# Patient Record
Sex: FEMALE | Race: BLACK OR AFRICAN AMERICAN | NOT HISPANIC OR LATINO | Employment: OTHER | URBAN - METROPOLITAN AREA
[De-identification: names, ages, dates, MRNs, and addresses within clinical notes are randomized per-mention and may not be internally consistent; named-entity substitution may affect disease eponyms.]

---

## 2017-06-16 ENCOUNTER — OFFICE VISIT (OUTPATIENT)
Dept: PODIATRY | Facility: CLINIC | Age: 77
End: 2017-06-16
Payer: MEDICARE

## 2017-06-16 VITALS — HEIGHT: 66 IN | BODY MASS INDEX: 32.62 KG/M2 | WEIGHT: 203 LBS

## 2017-06-16 DIAGNOSIS — M21.40 PES PLANUS, UNSPECIFIED LATERALITY: ICD-10-CM

## 2017-06-16 DIAGNOSIS — M25.571 ACUTE RIGHT ANKLE PAIN: ICD-10-CM

## 2017-06-16 DIAGNOSIS — M79.671 FOOT PAIN, RIGHT: ICD-10-CM

## 2017-06-16 DIAGNOSIS — M76.829 PTTD (POSTERIOR TIBIAL TENDON DYSFUNCTION): Primary | ICD-10-CM

## 2017-06-16 PROCEDURE — 1159F MED LIST DOCD IN RCRD: CPT | Mod: S$GLB,,, | Performed by: PODIATRIST

## 2017-06-16 PROCEDURE — 99999 PR PBB SHADOW E&M-EST. PATIENT-LVL III: CPT | Mod: PBBFAC,,, | Performed by: PODIATRIST

## 2017-06-16 PROCEDURE — 1125F AMNT PAIN NOTED PAIN PRSNT: CPT | Mod: S$GLB,,, | Performed by: PODIATRIST

## 2017-06-16 PROCEDURE — 99213 OFFICE O/P EST LOW 20 MIN: CPT | Mod: S$GLB,,, | Performed by: PODIATRIST

## 2017-06-16 RX ORDER — MELOXICAM 15 MG/1
15 TABLET ORAL DAILY
Qty: 30 TABLET | Refills: 1 | Status: SHIPPED | OUTPATIENT
Start: 2017-06-16 | End: 2017-06-16 | Stop reason: SDUPTHER

## 2017-06-16 RX ORDER — MELOXICAM 15 MG/1
15 TABLET ORAL DAILY
Qty: 30 TABLET | Refills: 1 | Status: SHIPPED | OUTPATIENT
Start: 2017-06-16 | End: 2017-08-11

## 2017-06-16 NOTE — PROGRESS NOTES
Subjective:      Patient ID: Nereida Canales is a 77 y.o. female.    Chief Complaint: Foot Pain (arch of right foot/ foot swollen )      Nereida Canales is a 77 y.o. female who presents to the podiatry clinic  with complaint of  right foot and ankle pain. Description: moderate Nature: aching, tight and sharp at times. Location: R ankle and instep. Onset of the symptoms was several days ago. Precipitating event: none known.  History of injury: none Current symptoms include: ability to bear weight, but with some pain, stiffness, swelling and worsening symptoms after a period of inactivity. Aggravating factors: walking. Alleviating factors: rest Symptoms have gradually worsened. Patient has had prior foot problems. Evaluation to date: none. Treatment to date: none. Patients rates pain 10/10 on pain scale initially which has improved to about a 4/10 currently.     Current shoe gear:  Easy spirit casual shoes    Patient Active Problem List   Diagnosis    S/P D&C/Hysteroscopy       Current Outpatient Prescriptions on File Prior to Visit   Medication Sig Dispense Refill    aspirin (ECOTRIN) 81 MG EC tablet Take 81 mg by mouth once daily.      hydrochlorothiazide (HYDRODIURIL) 25 MG tablet Take 25 mg by mouth once daily.  3    losartan (COZAAR) 100 MG tablet Take 100 mg by mouth once daily.  3    nifedipine (ADALAT CC) 60 MG TbSR Take 60 mg by mouth once daily.  3    [DISCONTINUED] FOLIC ACID/MULTIVIT-MIN/LUTEIN (CENTRUM SILVER ORAL) Take by mouth.       No current facility-administered medications on file prior to visit.        Allergies   Allergen Reactions    Pcn [Penicillins] Hives and Itching    Sulfa (Sulfonamide Antibiotics) Hives and Itching       Past Surgical History:   Procedure Laterality Date    BUNIONECTOMY Bilateral     Right 2012, Left 1995    DILATION AND CURETTAGE OF UTERUS  05/20/2016    MYOMECTOMY  1993       Family History   Problem Relation Age of Onset    Diabetes Mother      "Hypertension Mother     Cancer Mother 90     Bladder cancer    Lung cancer Sister     Cancer Sister      lung cancer    Cancer Maternal Grandmother      stomach cancer    Colon cancer Neg Hx     Ovarian cancer Neg Hx     Breast cancer Neg Hx     Stroke Neg Hx        Social History     Social History    Marital status:      Spouse name: N/A    Number of children: N/A    Years of education: N/A     Occupational History    Not on file.     Social History Main Topics    Smoking status: Never Smoker    Smokeless tobacco: Never Used    Alcohol use 0.6 oz/week     1 Glasses of wine per week    Drug use: No    Sexual activity: Yes     Partners: Male     Birth control/ protection: Post-menopausal     Other Topics Concern    Not on file     Social History Narrative    No narrative on file     Review of Systems   Constitution: Negative for chills, fever and weakness.   Cardiovascular: Negative for claudication and leg swelling.   Respiratory: Negative for cough and shortness of breath.    Skin: Negative for dry skin, itching, nail changes and rash.   Musculoskeletal: Positive for arthritis, joint pain and myalgias. Negative for falls, joint swelling and muscle weakness.        Foot and ankle pain   Gastrointestinal: Negative for diarrhea, nausea and vomiting.   Neurological: Negative for numbness, paresthesias and tremors.   Psychiatric/Behavioral: Negative for altered mental status and hallucinations.           Objective:       Vitals:    06/16/17 1436   Weight: 92.1 kg (203 lb)   Height: 5' 6" (1.676 m)   PainSc: 10-Worst pain ever   PainLoc: Foot     Physical Exam   Constitutional:  Non-toxic appearance. She does not have a sickly appearance. No distress.   Pt. is well-developed, well-nourished, appears stated age, in no acute distress, alert and oriented x 3. No evidence of depression, anxiety, or agitation. Calm, cooperative, and communicative. Appropriate interactions and affect. "   Cardiovascular:   Pulses:       Dorsalis pedis pulses are 2+ on the right side, and 2+ on the left side.        Posterior tibial pulses are 2+ on the right side, and 2+ on the left side.   dorsalis pedis, posterior tibial pulses, and perforating peroneal pulses are palpable bilaterally. Capillary refill time is within normal limits. Digital hair present.    Pulmonary/Chest: No respiratory distress.   Musculoskeletal:        Right ankle: No tenderness. No lateral malleolus, no medial malleolus, no AITFL, no CF ligament and no posterior TFL tenderness found. Achilles tendon exhibits no pain, no defect and normal Blankenship's test results.        Left ankle: No tenderness. No lateral malleolus, no medial malleolus, no AITFL, no CF ligament and no posterior TFL tenderness found. Achilles tendon exhibits no pain, no defect and normal Blankenship's test results.        Right foot: There is decreased range of motion, tenderness (Course of PT tendon as it crosses ankle joint and into its inerstion onto navicular ), bony tenderness (navcular at PT insertion) and swelling.        Left foot: There is normal range of motion, no tenderness and no bony tenderness.   Patient has hammertoes of digits 2-5 bilateral partially reducible without symptom today.    Pain with palpation of PT tendon R as it crosses ankle joint and onto its insertion onto the plantar medial navicular.     No pain with palpation of plantar medial calcaneal tuber left. No pain with lateral calc squeeze. No pain with STJ ROM or palpation of Achilles tendon/insertion, Abductor hallucis muscle belly, TA tendon/insertion left.     Hypermobility noted to 1st ray b/l with near complete collapse of medial longitudinal arch b/l with loading.   Pes planus foot type b/l.     MMT 5/5 with no pain on resistance of PF/DF/Inv/Ev resistance right.      Lymphadenopathy:   No lymphatic streaking    Negative lymphadenopathy bilateral popliteal fossa and tarsal tunnel.      Neurological:   [place]-Elizabeth 5.07 monofilament is intact bilateral feet. Sharp/dull sensation is also intact Bilateral feet. Proprioception is grossly intact. Vibratory sensation intact (pt able to sense vibration stop within 3-5 seconds)     Skin: Skin is warm, dry and intact. No rash noted. She is not diaphoretic. No cyanosis. No pallor. Nails show no clubbing.   Skin is of normal turgor. Normal temperature gradient. Examination of the skin reveals no evidence of significant rashes, open lesions, suspicious appearing nevi or other concerning lesions.      Psychiatric: Her mood appears not anxious. Her affect is not inappropriate. Her speech is not slurred. She is not combative. She is communicative. She is attentive.   Nursing note reviewed.            Assessment:       Encounter Diagnoses   Name Primary?    PTTD (posterior tibial tendon dysfunction) - Right Foot Yes    Pes planus, unspecified laterality     Foot pain, right     Acute right ankle pain          Plan:       Nereida was seen today for foot pain.    Diagnoses and all orders for this visit:    PTTD (posterior tibial tendon dysfunction) - Right Foot    Pes planus, unspecified laterality    Foot pain, right    Acute right ankle pain    Other orders  -     meloxicam (MOBIC) 15 MG tablet; Take 1 tablet (15 mg total) by mouth once daily.      I counseled the patient on her conditions, their implications and medical management.    Mobic once daily as needed for pain     Patient instructed on adequate icing techniques. Patient should ice the affected area at least once per day x 10 minutes for 10 days . I advised the  patient that extra icing would also be beneficial to ensure adequate anti inflammatory effect     Continue use of ankle brace to affected side for added support of the joint and to decrease motion and improve pain and stability. Advised to use at all times while ambulating.     Stretching handout dispensed to patient. Instructions on  adequate stretching reviewed in clinic     Discussed the use of compression stockings b/l to help control edema. Tubigrip applied today. Discussed proper and consistent elevation of lower extremities, above the level of the heart, while at rest, to help control/improve edema.     Long discussion with patient regarding appropriate, supportive and comfortable shoes. Recommended SAS/Easy Spirit style shoe brands or New balance, asics sneaker style shoes with adequate arch supports to alleviate abnormal pressure and improve stability of foot while walking. Avoid flat shoes and barefoot walking as these will exacerbate or worsen symptoms.     RTC 6 weeks, sooner PRN

## 2017-06-16 NOTE — PATIENT INSTRUCTIONS
Shoe recommendations:   asics (GT 1000 or gel foundations), new balance, samallory, cade, vionix (tennis shoe)    sofft brand, clarks, rashida, aerosoles, naturalizers, SAS, eccolisa cole hahn (dress shoes)    Vionix, volitiles, philliptocks, (sandals)    Nike comfort thong sandals (house shoes)

## 2017-06-19 ENCOUNTER — TELEPHONE (OUTPATIENT)
Dept: PODIATRY | Facility: CLINIC | Age: 77
End: 2017-06-19

## 2017-06-19 NOTE — TELEPHONE ENCOUNTER
----- Message from Ean Ward sent at 6/19/2017 11:06 AM CDT -----  Contact: self  Pt requesting a call back regarding wearing brace and diabetic shoes.  Please call pt at .    Thanks

## 2017-08-11 ENCOUNTER — OFFICE VISIT (OUTPATIENT)
Dept: PODIATRY | Facility: CLINIC | Age: 77
End: 2017-08-11
Payer: MEDICARE

## 2017-08-11 VITALS — WEIGHT: 203 LBS | HEIGHT: 66 IN | BODY MASS INDEX: 32.62 KG/M2

## 2017-08-11 DIAGNOSIS — M79.671 FOOT PAIN, RIGHT: Primary | ICD-10-CM

## 2017-08-11 DIAGNOSIS — M21.40 PES PLANUS, UNSPECIFIED LATERALITY: ICD-10-CM

## 2017-08-11 DIAGNOSIS — M76.821 POSTERIOR TIBIAL TENDON DYSFUNCTION (PTTD) OF RIGHT LOWER EXTREMITY: ICD-10-CM

## 2017-08-11 PROCEDURE — 99999 PR PBB SHADOW E&M-EST. PATIENT-LVL II: CPT | Mod: PBBFAC,,, | Performed by: PODIATRIST

## 2017-08-11 PROCEDURE — 1159F MED LIST DOCD IN RCRD: CPT | Mod: S$GLB,,, | Performed by: PODIATRIST

## 2017-08-11 PROCEDURE — 3008F BODY MASS INDEX DOCD: CPT | Mod: S$GLB,,, | Performed by: PODIATRIST

## 2017-08-11 PROCEDURE — 1126F AMNT PAIN NOTED NONE PRSNT: CPT | Mod: S$GLB,,, | Performed by: PODIATRIST

## 2017-08-11 PROCEDURE — 99213 OFFICE O/P EST LOW 20 MIN: CPT | Mod: S$GLB,,, | Performed by: PODIATRIST

## 2017-08-11 NOTE — PROGRESS NOTES
Subjective:      Patient ID: Nereida Canales is a 77 y.o. female.    Chief Complaint: Follow-up (right )      Nereida Canales is a 77 y.o. female who presents to the podiatry clinic  with complaint of  right foot and ankle pain. Description: moderate Nature: aching, tight and sharp at times. Location: R ankle and instep. Onset of the symptoms was several days ago. Precipitating event: none known.  History of injury: none Current symptoms include: ability to bear weight, but with some pain, stiffness, swelling and worsening symptoms after a period of inactivity. Aggravating factors: walking. Alleviating factors: rest Symptoms have gradually worsened. Patient has had prior foot problems. Evaluation to date: none. Treatment to date: none. Patients rates pain 10/10 on pain scale initially which has improved to about a 4/10 currently.     08/11/2017: 6 week follow up for R PPTD and ankle/foot pain. States she has been in ankle brace with supportive sneakers strictly and the pain is practically resolved. Has tried to walk once or twice without the brace and has had no pain. Did not need to take Mobic. Has been doing ROM exercises but no stretching. Happy that her pain has resolved.     Current shoe gear:  Sneakers and ankle brace.     Patient Active Problem List   Diagnosis    S/P D&C/Hysteroscopy       Current Outpatient Prescriptions on File Prior to Visit   Medication Sig Dispense Refill    aspirin (ECOTRIN) 81 MG EC tablet Take 81 mg by mouth once daily.      FOLIC ACID/MULTIVIT-MIN/LUTEIN (CENTRUM SILVER ORAL) Take by mouth.      hydrochlorothiazide (HYDRODIURIL) 25 MG tablet Take 25 mg by mouth once daily.  3    losartan (COZAAR) 100 MG tablet Take 100 mg by mouth once daily.  3    nifedipine (ADALAT CC) 60 MG TbSR Take 60 mg by mouth once daily.  3    [DISCONTINUED] meloxicam (MOBIC) 15 MG tablet Take 1 tablet (15 mg total) by mouth once daily. 30 tablet 1     No current facility-administered  medications on file prior to visit.        Allergies   Allergen Reactions    Pcn [Penicillins] Hives and Itching    Sulfa (Sulfonamide Antibiotics) Hives and Itching       Past Surgical History:   Procedure Laterality Date    BUNIONECTOMY Bilateral     Right 2012, Left 1995    DILATION AND CURETTAGE OF UTERUS  05/20/2016    MYOMECTOMY  1993       Family History   Problem Relation Age of Onset    Diabetes Mother     Hypertension Mother     Cancer Mother 90     Bladder cancer    Lung cancer Sister     Cancer Sister      lung cancer    Cancer Maternal Grandmother      stomach cancer    Colon cancer Neg Hx     Ovarian cancer Neg Hx     Breast cancer Neg Hx     Stroke Neg Hx        Social History     Social History    Marital status:      Spouse name: N/A    Number of children: N/A    Years of education: N/A     Occupational History    Not on file.     Social History Main Topics    Smoking status: Never Smoker    Smokeless tobacco: Never Used    Alcohol use 0.6 oz/week     1 Glasses of wine per week    Drug use: No    Sexual activity: Yes     Partners: Male     Birth control/ protection: Post-menopausal     Other Topics Concern    Not on file     Social History Narrative    No narrative on file     Review of Systems   Constitution: Negative for chills, fever and weakness.   Cardiovascular: Negative for claudication and leg swelling.   Respiratory: Negative for cough and shortness of breath.    Skin: Negative for dry skin, itching, nail changes and rash.   Musculoskeletal: Positive for arthritis, joint pain and myalgias. Negative for falls, joint swelling and muscle weakness.        Foot and ankle pain   Gastrointestinal: Negative for diarrhea, nausea and vomiting.   Neurological: Negative for numbness, paresthesias and tremors.   Psychiatric/Behavioral: Negative for altered mental status and hallucinations.           Objective:       Vitals:    08/11/17 1037   Weight: 92.1 kg (203 lb)  "  Height: 5' 6" (1.676 m)   PainSc: 0-No pain     Physical Exam   Constitutional:  Non-toxic appearance. She does not have a sickly appearance. No distress.   Pt. is well-developed, well-nourished, appears stated age, in no acute distress, alert and oriented x 3. No evidence of depression, anxiety, or agitation. Calm, cooperative, and communicative. Appropriate interactions and affect.   Cardiovascular:   Pulses:       Dorsalis pedis pulses are 2+ on the right side, and 2+ on the left side.        Posterior tibial pulses are 2+ on the right side, and 2+ on the left side.   dorsalis pedis, posterior tibial pulses, and perforating peroneal pulses are palpable bilaterally. Capillary refill time is within normal limits. Digital hair present.    Pulmonary/Chest: No respiratory distress.   Musculoskeletal:        Right ankle: No tenderness. No lateral malleolus, no medial malleolus, no AITFL, no CF ligament and no posterior TFL tenderness found. Achilles tendon exhibits no pain, no defect and normal Blankenship's test results.        Left ankle: No tenderness. No lateral malleolus, no medial malleolus, no AITFL, no CF ligament and no posterior TFL tenderness found. Achilles tendon exhibits no pain, no defect and normal Blankenship's test results.        Right foot: There is decreased range of motion. There is no tenderness, no bony tenderness and no swelling.        Left foot: There is normal range of motion, no tenderness and no bony tenderness.   Patient has hammertoes of digits 2-5 bilateral partially reducible without symptom today.    No pain with palpation of PT tendon R as it crosses ankle joint and onto its insertion onto the plantar medial navicular.     No pain with palpation of plantar medial calcaneal tuber left. No pain with lateral calc squeeze. No pain with STJ ROM or palpation of Achilles tendon/insertion, Abductor hallucis muscle belly, TA tendon/insertion left.     Hypermobility noted to 1st ray b/l with near " complete collapse of medial longitudinal arch b/l with loading.   Pes planus foot type b/l.     MMT 5/5 with no pain on resistance of PF/DF/Inv/Ev resistance right.      Lymphadenopathy:   No lymphatic streaking    Negative lymphadenopathy bilateral popliteal fossa and tarsal tunnel.     Neurological:   [place]-Elizabeth 5.07 monofilament is intact bilateral feet. Sharp/dull sensation is also intact Bilateral feet. Proprioception is grossly intact. Vibratory sensation intact (pt able to sense vibration stop within 3-5 seconds)     Skin: Skin is warm, dry and intact. No rash noted. She is not diaphoretic. No cyanosis. No pallor. Nails show no clubbing.   Skin is of normal turgor. Normal temperature gradient. Examination of the skin reveals no evidence of significant rashes, open lesions, suspicious appearing nevi or other concerning lesions.      Psychiatric: Her mood appears not anxious. Her affect is not inappropriate. Her speech is not slurred. She is not combative. She is communicative. She is attentive.   Nursing note reviewed.            Assessment:       Encounter Diagnoses   Name Primary?    Foot pain, right Yes    Posterior tibial tendon dysfunction (PTTD) of right lower extremity     Pes planus, unspecified laterality          Plan:       Nereida was seen today for follow-up.    Diagnoses and all orders for this visit:    Foot pain, right    Posterior tibial tendon dysfunction (PTTD) of right lower extremity    Pes planus, unspecified laterality      I counseled the patient on her conditions, their implications and medical management.    Resolution of foot and ankle pain.     D/c ankle brace. Resume if flare up occurs.  Continue supportive sneaker style shoes. Avoid barefoot walking and flat slippers as this may exacerbate symptoms.     Rx voltaren gel for flare ups. Has at home from previous knee pain. Advised to [place] on foot if pain returns.     Continue ROM exercises at home and add stretching of  Achilles tendon daily.     RTC as needed if pain returns.

## 2017-12-18 DIAGNOSIS — Z12.31 VISIT FOR SCREENING MAMMOGRAM: Primary | ICD-10-CM

## 2018-01-03 ENCOUNTER — HOSPITAL ENCOUNTER (OUTPATIENT)
Dept: RADIOLOGY | Facility: HOSPITAL | Age: 78
Discharge: HOME | End: 2018-01-03
Attending: FAMILY MEDICINE
Payer: MEDICARE

## 2018-01-03 VITALS — BODY MASS INDEX: 32.62 KG/M2 | WEIGHT: 203 LBS | HEIGHT: 66 IN

## 2018-01-03 DIAGNOSIS — Z12.31 VISIT FOR SCREENING MAMMOGRAM: ICD-10-CM

## 2018-01-03 PROCEDURE — 77067 SCR MAMMO BI INCL CAD: CPT | Mod: TC

## 2018-01-03 PROCEDURE — 77067 SCR MAMMO BI INCL CAD: CPT | Mod: 26,,, | Performed by: RADIOLOGY

## 2018-02-28 ENCOUNTER — OFFICE VISIT (OUTPATIENT)
Dept: PODIATRY | Facility: CLINIC | Age: 78
End: 2018-02-28
Payer: MEDICARE

## 2018-02-28 VITALS
WEIGHT: 203.06 LBS | HEIGHT: 66 IN | SYSTOLIC BLOOD PRESSURE: 136 MMHG | DIASTOLIC BLOOD PRESSURE: 76 MMHG | BODY MASS INDEX: 32.64 KG/M2

## 2018-02-28 DIAGNOSIS — M79.671 FOOT PAIN, BILATERAL: Primary | ICD-10-CM

## 2018-02-28 DIAGNOSIS — M79.672 FOOT PAIN, BILATERAL: Primary | ICD-10-CM

## 2018-02-28 DIAGNOSIS — M20.5X1 HALLUX LIMITUS, ACQUIRED, RIGHT: ICD-10-CM

## 2018-02-28 DIAGNOSIS — M25.572 PAIN IN JOINTS OF BOTH FEET: ICD-10-CM

## 2018-02-28 DIAGNOSIS — M25.571 PAIN IN JOINTS OF BOTH FEET: ICD-10-CM

## 2018-02-28 DIAGNOSIS — M20.5X2 HALLUX LIMITUS, ACQUIRED, LEFT: ICD-10-CM

## 2018-02-28 PROCEDURE — 99999 PR PBB SHADOW E&M-EST. PATIENT-LVL III: CPT | Mod: PBBFAC,,, | Performed by: PODIATRIST

## 2018-02-28 PROCEDURE — 99213 OFFICE O/P EST LOW 20 MIN: CPT | Mod: S$GLB,,, | Performed by: PODIATRIST

## 2018-02-28 RX ORDER — FLUOROMETHOLONE 1 MG/ML
SUSPENSION/ DROPS OPHTHALMIC
Refills: 0 | COMMUNITY
Start: 2017-12-22

## 2018-02-28 RX ORDER — FUROSEMIDE 40 MG/1
TABLET ORAL
Refills: 1 | COMMUNITY
Start: 2018-02-22

## 2018-02-28 NOTE — PATIENT INSTRUCTIONS
Over the counter pain cream: Biofreeze, Bengay, tiger balm, two old goat, lidocaine gel,  Absorbine Veterinary Liniment Gel Topical Analgesic Sore Muscle and Joint Pain Relief    Supplements for joints: glucosamine, MSM, garlic, Tumeric    Recommend lotions: eucerin, eucerin for diabetics, aquaphor, A&D ointment, gold bond for diabetics, sween, Bandar's Bees all purpose baby ointment,  urea 40 with aloe (found on amazon.com)    Shoe recommendations: (try 6pm.com, zappos.com , nordstromrack.Nightpro, or shoes.com for discounted prices) you can visit [employer] in [place]  or [facility] in the [facility] (there are also several shoe brand outlets in the [facility])    Asics (GT 2000 or gel foundations), new balance stability type shoes, saucony (stabil c3),  Arriaga (GTS or Beast or transcend), vionic, propet (tennis shoe)    sofft brand, clarks, crocs, aerosoles, naturalizers, SAS, ecco, born, jenifer delaney, rockports (dress shoes)    Vionic, burkenstocks, fitflops, propet (sandals)  Nike comfort thong sandals, crocs, propet (house shoes)    Nail Home remedy:  Vicks Vapor rub to nails for easier managability    Occasional soaks for 15-20 mins in luke warm water with 1 cup of listerine and 1 cup of apple cider vinegar are ok You may add several drops of oil of oregano or tea tree oil as well            What Is Arthritis in the Foot?  Degenerative arthritis is a condition that slowly wears away joints, the area where bones meet and move. In the beginning, you may notice that the affected joint seems stiff. It may even ache. As the joint lining (cartilage) breaks down, the bones rub against each other, causing pain and swelling. Over time, small pieces of rough or splintered bone (bone spurs) develop, and the joints range of motion becomes limited. But movement doesnt have to cause pain. The effects of arthritis can be reduced.    The big-toe joint  When arthritis affects your big toe, your foot hurts when it pushes off the  ground. Arthritis often appears in the big-toe joint along with a bunion (a bony bump at the side of the joint) or a bone spur on top of the joint.    Other joints  When arthritis affects the rear or midfoot joints, you feel pain when you put weight on your foot. Arthritis may affect the joint where the ankle and foot meet. It may also affect other joints nearby.  Date Last Reviewed: 7/1/2016 © 2000-2017 [employer]. [address] [place] [address], [place] [address]. All rights reserved. This information is not intended as a substitute for professional medical care. Always follow your healthcare professional's instructions.        Treating Arthritis in the Foot  If your symptoms are mild, medications may be enough to reduce pain and swelling. For more severe arthritis, surgery may be needed to improve the condition of the joint.    Medicine  Your doctor may prescribe medicine--pills or injections--to limit pain and swelling. Ice, aspirin, acetaminophen, or ibuprofen may help relieve mild symptoms that occur after activity.  Surgery and bone trimming  To ease movement and reduce pain, your doctor may trim damaged bone. If arthritis is severe, the joint may be fused or removed. If the bone is not damaged too badly, your doctor may simply shave away bone spurs. Any excess bone growth related to a bunion may also be trimmed.  Fusing joints  If damage is more severe, your doctor may fuse the joint to prevent the bones from rubbing. Afterward, staples, plates, or screws may hold the bones in place so they heal properly. In some cases, the joint may be removed and replaced with an implant.  After surgery  During the early stages of recovery, your foot is likely to be bandaged and immobilized for a while. For best results, follow up with your doctor as scheduled. These visits help ensure that your foot heals properly.  As you heal  After surgery, youll be told how to care for your incision and how soon to begin  walking on the foot. Until the foot can bear weight, you may need to walk with crutches or a cane.  For surgery on the big toe, your foot may be splinted to limit movement for several weeks. Despite this, you should be able to walk soon after surgery.  For surgery on rear or midfoot joints, you may need to wear a cast or surgical shoe. These joints are fairly large, so full recovery may take a few months. Once the bone has healed, any staples, plates, or screws may be removed.  Date Last Reviewed: 7/1/2016 © 2000-2017 [employer]. [address] [place] [address]. All rights reserved. This information is not intended as a substitute for professional medical care. Always follow your healthcare professional's instructions.        Foot Surgery: Degenerative Joint Disease    Degenerative joint disease (arthritis) often happens in the joint of a big toe. This bone growth may cause pain and stiffness in the joint. Left untreated, arthritis can break down the cartilage and destroy the joint. Your treatment choices depend on how damaged your joint is. There are many nonsurgical treatments, but if these are not helpful, surgery may be considered.    Cheilectomy  This is done when the arthritic joint and cartilage can be saved. A bone spur caused by arthritis may be symptomatic on the top of the big toe joint. The procedure involves removing this bone spur, usually with a small part of the top of the joint itself.  You will need to wear a surgical shoe for several weeks. Once the foot heals, joint movement is restored.    Fusion  In fusion, the cartilage and some bone on both sides of the joint are removed. Then, the big toe and metatarsal bones are held together with staples, screws, or a plate and screws. Your foot may be placed in a cast. While you heal, you will be asked not to bear weight on this foot. You may also need crutches for several weeks. Because the joint has been removed, your toe will  be less flexible.    Arthroplasty  During surgery, bone growth caused by the arthritis is trimmed, and part of the joint is removed. A pin can be used to align the bones and to keep them from touching. The pin is removed after several weeks. In some cases, the entire joint may be replaced with an implant. You may have to wear a splint or a surgical shoe for several weeks. When healed, the bones become connected with scar tissue.  Date Last Reviewed: 10/15/2015  © 8242-5167 [employer]. [address] [place] [address]. All rights reserved. This information is not intended as a substitute for professional medical care. Always follow your healthcare professional's instructions.

## 2018-02-28 NOTE — PROGRESS NOTES
Subjective:      Patient ID: Nereida Canales is a 78 y.o. female.    Chief Complaint: Foot Pain (pcp [place] / 2-20-18 /  top of Left foot and sometimes arch )    Nereida Canales is a 78 y.o. female who presents to the podiatry clinic  with complaint of  bilateral foot pain, L>>R. Description: moderate Nature: aching Location: left midfoot and forefoot. Onset of the symptoms was several months ago. Precipitating event: none known.  History of injury: no Current symptoms include:morning stiffness and  worsening symptoms after a period of activity. Aggravating factors: walking. Alleviating factors: rest Symptoms have progressed to a point and plateaued. Patient has had prior foot problems. Treatment to date: none. Patients rates pain 8/10 on pain scale.    Current shoe gear:  Casual shoes    Patient Active Problem List   Diagnosis    S/P D&C/Hysteroscopy       Current Outpatient Prescriptions on File Prior to Visit   Medication Sig Dispense Refill    aspirin (ECOTRIN) 81 MG EC tablet Take 81 mg by mouth once daily.      FOLIC ACID/MULTIVIT-MIN/LUTEIN (CENTRUM SILVER ORAL) Take by mouth.      hydrochlorothiazide (HYDRODIURIL) 25 MG tablet Take 25 mg by mouth once daily.  3    losartan (COZAAR) 100 MG tablet Take 100 mg by mouth once daily.  3    nifedipine (ADALAT CC) 60 MG TbSR Take 60 mg by mouth once daily.  3     No current facility-administered medications on file prior to visit.        Review of patient's allergies indicates:   Allergen Reactions    Pcn [penicillins] Hives and Itching    Sulfa (sulfonamide antibiotics) Hives and Itching       Past Surgical History:   Procedure Laterality Date    BUNIONECTOMY Bilateral     Right 2012, Left 1995    DILATION AND CURETTAGE OF UTERUS  05/20/2016    MYOMECTOMY  1993       Family History   Problem Relation Age of Onset    Diabetes Mother     Hypertension Mother     Cancer Mother 90     Bladder cancer    Lung cancer Sister     Cancer Sister       "lung cancer    Cancer Maternal Grandmother      stomach cancer    Colon cancer Neg Hx     Ovarian cancer Neg Hx     Breast cancer Neg Hx     Stroke Neg Hx        Social History     Social History    Marital status:      Spouse name: N/A    Number of children: N/A    Years of education: N/A     Occupational History    Not on file.     Social History Main Topics    Smoking status: Never Smoker    Smokeless tobacco: Never Used    Alcohol use 0.6 oz/week     1 Glasses of wine per week    Drug use: No    Sexual activity: Yes     Partners: Male     Birth control/ protection: Post-menopausal     Other Topics Concern    Not on file     Social History Narrative    No narrative on file     Review of Systems   Constitution: Negative for chills, fever and weakness.   Cardiovascular: Positive for leg swelling. Negative for claudication.   Respiratory: Negative for cough and shortness of breath.    Skin: Negative for dry skin, itching, nail changes and rash.   Musculoskeletal: Positive for arthritis, joint pain and myalgias. Negative for falls and muscle weakness.   Gastrointestinal: Negative for diarrhea, nausea and vomiting.   Neurological: Negative for numbness, paresthesias and tremors.   Psychiatric/Behavioral: Negative for altered mental status and hallucinations.           Objective:       Vitals:    02/28/18 1006   BP: 136/76   Weight: 92.1 kg (203 lb 0.7 oz)   Height: 5' 6" (1.676 m)   PainSc:   8   PainLoc: Foot     Physical Exam   Constitutional:  Non-toxic appearance. She does not have a sickly appearance. No distress.   Pt. is well-developed, well-nourished, appears stated age, in no acute distress, alert and oriented x 3. No evidence of depression, anxiety, or agitation. Calm, cooperative, and communicative. Appropriate interactions and affect.   Cardiovascular:   Pulses:       Dorsalis pedis pulses are 2+ on the right side, and 2+ on the left side.        Posterior tibial pulses are 2+ on the " right side, and 2+ on the left side.   dorsalis pedis, posterior tibial pulses, and perforating peroneal pulses are palpable bilaterally. Capillary refill time is within normal limits. Digital hair present.    Pulmonary/Chest: No respiratory distress.   Musculoskeletal:        Right ankle: No tenderness. No lateral malleolus, no medial malleolus, no AITFL, no CF ligament and no posterior TFL tenderness found. Achilles tendon exhibits no pain, no defect and normal Blankenship's test results.        Left ankle: No tenderness. No lateral malleolus, no medial malleolus, no AITFL, no CF ligament and no posterior TFL tenderness found. Achilles tendon exhibits no pain, no defect and normal Blankenship's test results.        Right foot: There is decreased range of motion and tenderness. There is no bony tenderness.        Left foot: There is decreased range of motion and tenderness. There is no bony tenderness.   Patient has hammertoes of digits 2-5 bilateral partially reducible without symptom today.    Decreased first MPJ range of motion both weightbearing and nonweightbearing, + crepitus observed the first MP joint, + dorsal flag sign. Mild  bunion deformity is observed .     Lymphadenopathy:   No lymphatic streaking    Negative lymphadenopathy bilateral popliteal fossa and tarsal tunnel.     Neurological:   Schoenchen-Elizabeth 5.07 monofilament is intact bilateral feet. Sharp/dull sensation is also intact Bilateral feet. Proprioception is grossly intact. Vibratory sensation intact (pt able to sense vibration stop within 3-5 seconds)     Skin: Skin is warm, dry and intact. No rash noted. She is not diaphoretic. No cyanosis. No pallor. Nails show no clubbing.   Skin is of normal turgor. Normal temperature gradient. Examination of the skin reveals no evidence of significant rashes, open lesions, suspicious appearing nevi or other concerning lesions.      Psychiatric: Her mood appears not anxious. Her affect is not inappropriate.  Her speech is not slurred. She is not combative. She is communicative. She is attentive.   Nursing note reviewed.            Assessment:       Encounter Diagnoses   Name Primary?    Foot pain, bilateral Yes    Pain in joints of both feet     Hallux limitus, acquired, left     Hallux limitus, acquired, right          Plan:       Nereida was seen today for foot pain.    Diagnoses and all orders for this visit:    Foot pain, bilateral    Pain in joints of both feet    Hallux limitus, acquired, left    Hallux limitus, acquired, right      I counseled the patient on her conditions, their implications and medical management.     Discussed biomechanical deformity correction surgically vs conservative management     I did  the patient in detail regarding surgical and conservative treatment measures for hallux limitus. I informed the  patient that the majority of pain is secondary to an arthritic joint with decreased joint spaces. Informed patient that outside of surgical intervention the main goal of therapy is to decreased the  range of motion at the first MPJ joint. This can be done so by utilizing either and extremely hard soled nonflexible shoe or a rocker bottom shoe such as a [employer] shape up     Patient instructed on adequate icing techniques. Patient should ice the affected area at least once per day x 10 minutes for 10 days . I advised the  patient that extra icing would also be beneficial to ensure adequate anti inflammatory effect     Stretching handout dispensed to patient. Instructions on adequate stretching reviewed in clinic     Patient declines NSAID. Recommend glucosamine supplement. She declines injection or rx medication    RTC PRN

## 2019-01-03 DIAGNOSIS — Z12.39 SCREENING BREAST EXAMINATION: Primary | ICD-10-CM

## 2019-01-08 ENCOUNTER — HOSPITAL ENCOUNTER (OUTPATIENT)
Dept: RADIOLOGY | Facility: HOSPITAL | Age: 79
Discharge: HOME | End: 2019-01-08
Attending: FAMILY MEDICINE
Payer: MEDICARE

## 2019-01-08 VITALS — HEIGHT: 66 IN | BODY MASS INDEX: 32.62 KG/M2 | WEIGHT: 203 LBS

## 2019-01-08 DIAGNOSIS — Z12.39 SCREENING BREAST EXAMINATION: ICD-10-CM

## 2019-01-08 PROCEDURE — 77067 SCR MAMMO BI INCL CAD: CPT | Mod: 26,,, | Performed by: RADIOLOGY

## 2019-01-08 PROCEDURE — 77067 MAMMO DIGITAL SCREENING BILAT WITH CAD: ICD-10-PCS | Mod: 26,,, | Performed by: RADIOLOGY

## 2019-01-08 PROCEDURE — 77067 SCR MAMMO BI INCL CAD: CPT | Mod: TC

## 2019-11-20 DIAGNOSIS — Z12.31 ENCOUNTER FOR SCREENING MAMMOGRAM FOR MALIGNANT NEOPLASM OF BREAST: Primary | ICD-10-CM

## 2020-01-09 ENCOUNTER — HOSPITAL ENCOUNTER (OUTPATIENT)
Dept: RADIOLOGY | Facility: HOSPITAL | Age: 80
Discharge: HOME | End: 2020-01-09
Attending: FAMILY MEDICINE
Payer: MEDICARE

## 2020-01-09 VITALS — HEIGHT: 66 IN | WEIGHT: 203 LBS | BODY MASS INDEX: 32.62 KG/M2

## 2020-01-09 DIAGNOSIS — Z12.31 ENCOUNTER FOR SCREENING MAMMOGRAM FOR MALIGNANT NEOPLASM OF BREAST: ICD-10-CM

## 2020-01-09 PROCEDURE — 77063 BREAST TOMOSYNTHESIS BI: CPT | Mod: 26,,, | Performed by: RADIOLOGY

## 2020-01-09 PROCEDURE — 77067 MAMMO DIGITAL SCREENING BILAT WITH TOMOSYNTHESIS_CAD: ICD-10-PCS | Mod: 26,,, | Performed by: RADIOLOGY

## 2020-01-09 PROCEDURE — 77067 SCR MAMMO BI INCL CAD: CPT | Mod: TC

## 2020-01-09 PROCEDURE — 77067 SCR MAMMO BI INCL CAD: CPT | Mod: 26,,, | Performed by: RADIOLOGY

## 2020-01-09 PROCEDURE — 77063 MAMMO DIGITAL SCREENING BILAT WITH TOMOSYNTHESIS_CAD: ICD-10-PCS | Mod: 26,,, | Performed by: RADIOLOGY

## 2020-05-14 ENCOUNTER — LAB VISIT (OUTPATIENT)
Dept: FAMILY MEDICINE | Facility: CLINIC | Age: 80
End: 2020-05-14
Payer: MEDICARE

## 2020-05-14 DIAGNOSIS — Z20.822 SUSPECTED COVID-19 VIRUS INFECTION: Primary | ICD-10-CM

## 2020-05-14 DIAGNOSIS — Z20.822 SUSPECTED COVID-19 VIRUS INFECTION: ICD-10-CM

## 2020-05-14 PROCEDURE — U0002 COVID-19 LAB TEST NON-CDC: HCPCS

## 2020-05-15 LAB — SARS-COV-2 RNA RESP QL NAA+PROBE: NOT DETECTED

## 2021-01-15 ENCOUNTER — HOSPITAL ENCOUNTER (OUTPATIENT)
Dept: RADIOLOGY | Facility: HOSPITAL | Age: 81
Discharge: HOME | End: 2021-01-15
Attending: FAMILY MEDICINE
Payer: MEDICARE

## 2021-01-15 VITALS — BODY MASS INDEX: 32.77 KG/M2 | WEIGHT: 203 LBS

## 2021-01-15 DIAGNOSIS — Z12.31 ENCOUNTER FOR SCREENING MAMMOGRAM FOR MALIGNANT NEOPLASM OF BREAST: ICD-10-CM

## 2021-01-15 PROCEDURE — 77067 SCR MAMMO BI INCL CAD: CPT | Mod: TC

## 2021-01-15 PROCEDURE — 77067 MAMMO DIGITAL SCREENING BILAT: ICD-10-PCS | Mod: 26,,, | Performed by: RADIOLOGY

## 2021-01-15 PROCEDURE — 77067 SCR MAMMO BI INCL CAD: CPT | Mod: 26,,, | Performed by: RADIOLOGY

## 2021-12-01 DIAGNOSIS — Z12.31 ENCOUNTER FOR SCREENING MAMMOGRAM FOR MALIGNANT NEOPLASM OF BREAST: Primary | ICD-10-CM

## 2022-01-18 ENCOUNTER — HOSPITAL ENCOUNTER (OUTPATIENT)
Dept: RADIOLOGY | Facility: HOSPITAL | Age: 82
Discharge: HOME | End: 2022-01-18
Attending: FAMILY MEDICINE
Payer: MEDICARE

## 2022-01-18 VITALS — BODY MASS INDEX: 32.62 KG/M2 | WEIGHT: 203 LBS | HEIGHT: 66 IN

## 2022-01-18 DIAGNOSIS — Z12.31 ENCOUNTER FOR SCREENING MAMMOGRAM FOR MALIGNANT NEOPLASM OF BREAST: ICD-10-CM

## 2022-01-18 PROCEDURE — 77067 MAMMO DIGITAL SCREENING BILAT: ICD-10-PCS | Mod: 26,,, | Performed by: RADIOLOGY

## 2022-01-18 PROCEDURE — 77067 SCR MAMMO BI INCL CAD: CPT | Mod: TC

## 2022-01-18 PROCEDURE — 77067 SCR MAMMO BI INCL CAD: CPT | Mod: 26,,, | Performed by: RADIOLOGY

## 2022-12-13 ENCOUNTER — IMMUNIZATION (OUTPATIENT)
Dept: OBSTETRICS AND GYNECOLOGY | Facility: CLINIC | Age: 82
End: 2022-12-13
Payer: MEDICARE

## 2022-12-13 DIAGNOSIS — Z23 NEED FOR VACCINATION: Primary | ICD-10-CM

## 2022-12-13 PROCEDURE — 91312 COVID-19, MRNA, LNP-S, BIVALENT BOOSTER, PF, 30 MCG/0.3 ML DOSE: CPT | Mod: S$GLB,,, | Performed by: FAMILY MEDICINE

## 2022-12-13 PROCEDURE — 0124A COVID-19, MRNA, LNP-S, BIVALENT BOOSTER, PF, 30 MCG/0.3 ML DOSE: CPT | Mod: PBBFAC | Performed by: FAMILY MEDICINE

## 2022-12-13 PROCEDURE — 91312 COVID-19, MRNA, LNP-S, BIVALENT BOOSTER, PF, 30 MCG/0.3 ML DOSE: ICD-10-PCS | Mod: S$GLB,,, | Performed by: FAMILY MEDICINE

## 2023-11-01 DIAGNOSIS — Z12.39 SCREENING BREAST EXAMINATION: Primary | ICD-10-CM

## 2023-11-01 DIAGNOSIS — Z12.31 ENCOUNTER FOR SCREENING MAMMOGRAM FOR MALIGNANT NEOPLASM OF BREAST: ICD-10-CM

## 2023-11-10 ENCOUNTER — HOSPITAL ENCOUNTER (OUTPATIENT)
Dept: RADIOLOGY | Facility: HOSPITAL | Age: 83
Discharge: HOME | End: 2023-11-10
Attending: FAMILY MEDICINE
Payer: MEDICARE

## 2023-11-10 VITALS — HEIGHT: 66 IN | WEIGHT: 203.06 LBS | BODY MASS INDEX: 32.64 KG/M2

## 2023-11-10 DIAGNOSIS — Z12.31 ENCOUNTER FOR SCREENING MAMMOGRAM FOR MALIGNANT NEOPLASM OF BREAST: ICD-10-CM

## 2023-11-10 PROCEDURE — 77067 SCR MAMMO BI INCL CAD: CPT | Mod: 26,,, | Performed by: RADIOLOGY

## 2023-11-10 PROCEDURE — 77063 MAMMO DIGITAL SCREENING BILAT WITH TOMO: ICD-10-PCS | Mod: 26,,, | Performed by: RADIOLOGY

## 2023-11-10 PROCEDURE — 77067 SCR MAMMO BI INCL CAD: CPT | Mod: TC

## 2023-11-10 PROCEDURE — 77063 BREAST TOMOSYNTHESIS BI: CPT | Mod: 26,,, | Performed by: RADIOLOGY

## 2023-11-10 PROCEDURE — 77067 MAMMO DIGITAL SCREENING BILAT WITH TOMO: ICD-10-PCS | Mod: 26,,, | Performed by: RADIOLOGY

## 2024-12-13 ENCOUNTER — HOSPITAL ENCOUNTER (OUTPATIENT)
Dept: RADIOLOGY | Facility: HOSPITAL | Age: 84
Discharge: HOME | End: 2024-12-13
Attending: FAMILY MEDICINE
Payer: MEDICARE

## 2024-12-13 DIAGNOSIS — Z12.31 ENCOUNTER FOR SCREENING MAMMOGRAM FOR MALIGNANT NEOPLASM OF BREAST: ICD-10-CM

## 2024-12-13 PROCEDURE — 77067 SCR MAMMO BI INCL CAD: CPT | Mod: TC

## 2024-12-13 PROCEDURE — 77067 SCR MAMMO BI INCL CAD: CPT | Mod: 26,,, | Performed by: RADIOLOGY

## 2024-12-13 PROCEDURE — 77063 BREAST TOMOSYNTHESIS BI: CPT | Mod: 26,,, | Performed by: RADIOLOGY
